# Patient Record
Sex: MALE | Race: WHITE | HISPANIC OR LATINO | Employment: FULL TIME | URBAN - METROPOLITAN AREA
[De-identification: names, ages, dates, MRNs, and addresses within clinical notes are randomized per-mention and may not be internally consistent; named-entity substitution may affect disease eponyms.]

---
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
## 2021-07-29 ENCOUNTER — NON-PROVIDER VISIT (OUTPATIENT)
Dept: URGENT CARE | Facility: PHYSICIAN GROUP | Age: 21
End: 2021-07-29

## 2021-07-29 DIAGNOSIS — Z02.1 PRE-EMPLOYMENT DRUG SCREENING: ICD-10-CM

## 2021-07-29 LAB
AMP AMPHETAMINE: NORMAL
COC COCAINE: NORMAL
INT CON NEG: NORMAL
INT CON POS: NORMAL
MET METHAMPHETAMINES: NORMAL
OPI OPIATES: NORMAL
PCP PHENCYCLIDINE: NORMAL
POC DRUG COMMENT 753798-OCCUPATIONAL HEALTH: NEGATIVE
THC: NORMAL

## 2021-07-29 PROCEDURE — 80305 DRUG TEST PRSMV DIR OPT OBS: CPT | Performed by: PHYSICIAN ASSISTANT

## 2021-10-28 ENCOUNTER — NON-PROVIDER VISIT (OUTPATIENT)
Dept: OCCUPATIONAL MEDICINE | Facility: CLINIC | Age: 21
End: 2021-10-28

## 2021-10-28 DIAGNOSIS — Z02.1 PRE-EMPLOYMENT DRUG SCREENING: ICD-10-CM

## 2021-10-28 PROCEDURE — 80305 DRUG TEST PRSMV DIR OPT OBS: CPT | Performed by: NURSE PRACTITIONER

## 2022-02-19 ENCOUNTER — NON-PROVIDER VISIT (OUTPATIENT)
Dept: URGENT CARE | Facility: CLINIC | Age: 22
End: 2022-02-19

## 2022-02-19 DIAGNOSIS — Z02.1 PRE-EMPLOYMENT DRUG SCREENING: ICD-10-CM

## 2022-02-19 LAB
AMP AMPHETAMINE: NORMAL
COC COCAINE: NORMAL
INT CON NEG: NEGATIVE
INT CON POS: POSITIVE
MET METHAMPHETAMINES: NORMAL
OPI OPIATES: NORMAL
PCP PHENCYCLIDINE: NORMAL
POC DRUG COMMENT 753798-OCCUPATIONAL HEALTH: NEGATIVE
THC: NORMAL

## 2022-02-19 PROCEDURE — 80305 DRUG TEST PRSMV DIR OPT OBS: CPT | Performed by: NURSE PRACTITIONER

## 2023-02-22 ENCOUNTER — HOSPITAL ENCOUNTER (OUTPATIENT)
Dept: LAB | Facility: MEDICAL CENTER | Age: 23
End: 2023-02-22
Attending: NURSE PRACTITIONER
Payer: COMMERCIAL

## 2023-02-22 ENCOUNTER — OFFICE VISIT (OUTPATIENT)
Dept: MEDICAL GROUP | Facility: PHYSICIAN GROUP | Age: 23
End: 2023-02-22
Payer: COMMERCIAL

## 2023-02-22 VITALS
TEMPERATURE: 97.8 F | BODY MASS INDEX: 22.14 KG/M2 | WEIGHT: 149.5 LBS | HEART RATE: 64 BPM | OXYGEN SATURATION: 97 % | HEIGHT: 69 IN | DIASTOLIC BLOOD PRESSURE: 64 MMHG | SYSTOLIC BLOOD PRESSURE: 134 MMHG

## 2023-02-22 DIAGNOSIS — Z23 NEED FOR VACCINATION: ICD-10-CM

## 2023-02-22 DIAGNOSIS — Z11.3 SCREENING EXAMINATION FOR SEXUALLY TRANSMITTED DISEASE: ICD-10-CM

## 2023-02-22 DIAGNOSIS — Z76.89 ENCOUNTER TO ESTABLISH CARE: ICD-10-CM

## 2023-02-22 LAB — HIV 1+2 AB+HIV1 P24 AG SERPL QL IA: NORMAL

## 2023-02-22 PROCEDURE — 87340 HEPATITIS B SURFACE AG IA: CPT

## 2023-02-22 PROCEDURE — 90651 9VHPV VACCINE 2/3 DOSE IM: CPT | Performed by: NURSE PRACTITIONER

## 2023-02-22 PROCEDURE — 87389 HIV-1 AG W/HIV-1&-2 AB AG IA: CPT

## 2023-02-22 PROCEDURE — 87661 TRICHOMONAS VAGINALIS AMPLIF: CPT

## 2023-02-22 PROCEDURE — 87491 CHLMYD TRACH DNA AMP PROBE: CPT

## 2023-02-22 PROCEDURE — 90471 IMMUNIZATION ADMIN: CPT | Performed by: NURSE PRACTITIONER

## 2023-02-22 PROCEDURE — 99203 OFFICE O/P NEW LOW 30 MIN: CPT | Mod: 25 | Performed by: NURSE PRACTITIONER

## 2023-02-22 PROCEDURE — 86803 HEPATITIS C AB TEST: CPT

## 2023-02-22 PROCEDURE — 86780 TREPONEMA PALLIDUM: CPT

## 2023-02-22 PROCEDURE — 86704 HEP B CORE ANTIBODY TOTAL: CPT

## 2023-02-22 PROCEDURE — 86706 HEP B SURFACE ANTIBODY: CPT

## 2023-02-22 PROCEDURE — 36415 COLL VENOUS BLD VENIPUNCTURE: CPT

## 2023-02-22 PROCEDURE — 87591 N.GONORRHOEAE DNA AMP PROB: CPT

## 2023-02-22 NOTE — ASSESSMENT & PLAN NOTE
Mynor is here today to establish care with a new primary care provider. He is currently not taking any medications.

## 2023-02-22 NOTE — PROGRESS NOTES
"Subjective  Chief Complaint  Establish Care    History of Present Illness  Mynor Bush is a 22 y.o. male. This patient is here today to establish care.    Encounter to establish care  Mynor is here today to establish care with a new primary care provider. He is currently not taking any medications.    Screening examination for sexually transmitted disease  Requesting STI screening at this time. He does not know if he has been exposed to any STI's.    Past Medical History    Allergies: Patient has no known allergies.  History reviewed. No pertinent past medical history.  History reviewed. No pertinent surgical history.  No current [facility]-ordered outpatient medications on file.     No current [facility]-ordered facility-administered medications on file.     Family History:    Family History   Problem Relation Age of Onset    No Known Problems Mother     Hypertension Father       Personal/Social History:    Social History     Tobacco Use    Smoking status: Never    Smokeless tobacco: Never   Vaping Use    Vaping Use: Every day    Substances: Nicotine, Flavoring   Substance Use Topics    Alcohol use: Not Currently     Comment: occ    Drug use: Yes     Types: Marijuana     Social History     Social History Narrative    Not on file      Review of Systems:     General: Negative for fever/chills and unexpected weight change.    Respiratory:  Negative for cough and dyspnea.     Cardiovascular:  Negative for chest pain and palpitations.   Musculoskeletal:  Negative for myalgias.    Skin:  Negative for rash.     Objective  Physical Exam:   /64 (BP Location: Left arm, Patient Position: Sitting, BP Cuff Size: Adult)   Pulse 64   Temp 36.6 °C (97.8 °F) (Temporal)   Ht 1.753 m (5' 9\")   Wt 67.8 kg (149 lb 8 oz)   SpO2 97%  Body mass index is 22.08 kg/m².  General:  Alert and oriented.  Well appearing.  NAD.  Head:  Normocephalic.   Neck: Supple without JVD. No lymphadenopathy.  Pulmonary:  Normal effort.  Clear to " ausculation without rales, ronchi, or wheezing.  Cardiovascular:  Regular rate and rhythm without murmur, rubs or gallop.  Radial pulses are intact and equal bilaterally.  Musculoskeletal:  No extremity cyanosis, clubbing, or edema.  Skin:  Warm and dry.  No obvious lesions.  Psych: Normal mood and affect. Alert and oriented x3. Judgment and insight is normal.      Assessment/Plan   1. Encounter to establish care  Mynor is here today to establish care with a new primary care provider.    2. Screening examination for sexually transmitted disease  Acute and ongoing.  He is requesting to get lab work done for STI's, orders placed at this time.  - Chlamydia/GC, PCR (Urine); Future  - HIV AG/AB Combo Assay Screening; Future  - T.Pallidum AB SREEDHAR (Screening); Future  - Trichomonas Vaginalis by TMA; Future  - Hepatitis C Virus Antibody; Future  - HEP B Surface Antibody; Future  - Hep B Core AB Total; Future  - Hep B Surface Antigen; Future    3. Need for vaccination  - Gardasil 9      Health Maintenance: Records Requested    Return if symptoms worsen or fail to improve.    I have placed the above orders and discussed them with an approved delegating provider.  The MA is performing the below orders under the direction of Dr. Karl Alex MD/DO.     Discussed that the patient carries some responsibility in management of their health care.    Please note that this dictation was created using voice recognition software. I have made every reasonable attempt to correct obvious errors, but I expect that there are errors of grammar and possibly content that I did not discover before finalizing the note.    EDMOND Duff  [facility]

## 2023-02-23 LAB
C TRACH DNA SPEC QL NAA+PROBE: NEGATIVE
HBV CORE AB SERPL QL IA: NONREACTIVE
HBV SURFACE AB SERPL IA-ACNC: <3.5 MIU/ML (ref 0–10)
HBV SURFACE AG SER QL: NORMAL
HCV AB SER QL: NORMAL
N GONORRHOEA DNA SPEC QL NAA+PROBE: NEGATIVE
SPECIMEN SOURCE: NORMAL
T PALLIDUM AB SER QL IA: NORMAL

## 2023-02-24 ENCOUNTER — TELEPHONE (OUTPATIENT)
Dept: MEDICAL GROUP | Facility: PHYSICIAN GROUP | Age: 23
End: 2023-02-24

## 2023-02-24 NOTE — TELEPHONE ENCOUNTER
Phone Number Called: [phone]    Call outcome: Spoke to patient regarding message below.    ----- Message from LEONEL DelgadilloPAishaRANUEL sent at 2/24/2023  7:18 AM PST -----  Please call the patient and let him know that his STI testing call came back negative.    Thank you,    EDMOND Duff

## 2023-02-25 LAB
SPEC CONTAINER SPEC: NORMAL
SPECIMEN SOURCE: NORMAL
T VAGINALIS RRNA SPEC QL NAA+PROBE: NEGATIVE

## 2023-04-24 ENCOUNTER — NON-PROVIDER VISIT (OUTPATIENT)
Dept: MEDICAL GROUP | Facility: PHYSICIAN GROUP | Age: 23
End: 2023-04-24
Payer: COMMERCIAL

## 2023-04-24 DIAGNOSIS — Z23 NEED FOR VACCINATION: ICD-10-CM

## 2023-04-24 PROCEDURE — 90651 9VHPV VACCINE 2/3 DOSE IM: CPT | Performed by: NURSE PRACTITIONER

## 2023-04-24 PROCEDURE — 90471 IMMUNIZATION ADMIN: CPT | Performed by: NURSE PRACTITIONER

## 2023-04-24 NOTE — PROGRESS NOTES
"Mynor Bush is a 22 y.o. male here for a non-provider visit for:   HPV 2 of 3    Reason for immunization: continue or complete series started at the office  Immunization records indicate need for vaccine: Yes, confirmed with Epic  Minimum interval has been met for this vaccine: Yes  ABN completed: Not Indicated    VIS Dated  8/6/21 was given to patient: Yes  All IAC Questionnaire questions were answered \"No.\"    Patient tolerated injection and no adverse effects were observed or reported: Yes    Pt scheduled for next dose in series: Yes, 8/25/2023  "

## 2023-06-28 ENCOUNTER — HOSPITAL ENCOUNTER (OUTPATIENT)
Facility: MEDICAL CENTER | Age: 23
End: 2023-06-28
Attending: FAMILY MEDICINE
Payer: COMMERCIAL

## 2023-06-28 ENCOUNTER — HOSPITAL ENCOUNTER (OUTPATIENT)
Dept: LAB | Facility: MEDICAL CENTER | Age: 23
End: 2023-06-28
Attending: FAMILY MEDICINE
Payer: COMMERCIAL

## 2023-06-28 ENCOUNTER — OFFICE VISIT (OUTPATIENT)
Dept: MEDICAL GROUP | Facility: MEDICAL CENTER | Age: 23
End: 2023-06-28
Payer: COMMERCIAL

## 2023-06-28 VITALS
HEART RATE: 77 BPM | TEMPERATURE: 97.5 F | WEIGHT: 151 LBS | SYSTOLIC BLOOD PRESSURE: 146 MMHG | DIASTOLIC BLOOD PRESSURE: 72 MMHG | BODY MASS INDEX: 22.36 KG/M2 | HEIGHT: 69 IN | OXYGEN SATURATION: 98 %

## 2023-06-28 DIAGNOSIS — S60.10XS: ICD-10-CM

## 2023-06-28 DIAGNOSIS — R03.0 ELEVATED BP WITHOUT DIAGNOSIS OF HYPERTENSION: ICD-10-CM

## 2023-06-28 DIAGNOSIS — Z72.0 VAPES NICOTINE CONTAINING SUBSTANCE: ICD-10-CM

## 2023-06-28 DIAGNOSIS — Z11.3 SCREENING EXAMINATION FOR SEXUALLY TRANSMITTED DISEASE: ICD-10-CM

## 2023-06-28 DIAGNOSIS — R39.15 URINARY URGENCY: ICD-10-CM

## 2023-06-28 PROBLEM — Z76.89 ENCOUNTER TO ESTABLISH CARE: Status: RESOLVED | Noted: 2023-02-22 | Resolved: 2023-06-28

## 2023-06-28 LAB
ALBUMIN SERPL BCP-MCNC: 5 G/DL (ref 3.2–4.9)
ALBUMIN/GLOB SERPL: 1.8 G/DL
ALP SERPL-CCNC: 91 U/L (ref 30–99)
ALT SERPL-CCNC: 14 U/L (ref 2–50)
ANION GAP SERPL CALC-SCNC: 14 MMOL/L (ref 7–16)
APPEARANCE UR: CLEAR
APPEARANCE UR: CLEAR
AST SERPL-CCNC: 15 U/L (ref 12–45)
BASOPHILS # BLD AUTO: 0.9 % (ref 0–1.8)
BASOPHILS # BLD: 0.07 K/UL (ref 0–0.12)
BILIRUB SERPL-MCNC: 0.4 MG/DL (ref 0.1–1.5)
BILIRUB UR QL STRIP.AUTO: NEGATIVE
BILIRUB UR STRIP-MCNC: NEGATIVE MG/DL
BUN SERPL-MCNC: 12 MG/DL (ref 8–22)
C TRACH DNA SPEC QL NAA+PROBE: NEGATIVE
CALCIUM ALBUM COR SERPL-MCNC: 9 MG/DL (ref 8.5–10.5)
CALCIUM SERPL-MCNC: 9.8 MG/DL (ref 8.5–10.5)
CHLORIDE SERPL-SCNC: 101 MMOL/L (ref 96–112)
CO2 SERPL-SCNC: 24 MMOL/L (ref 20–33)
COLOR UR AUTO: YELLOW
COLOR UR: YELLOW
CREAT SERPL-MCNC: 0.86 MG/DL (ref 0.5–1.4)
CRP SERPL HS-MCNC: <0.3 MG/DL (ref 0–0.75)
EOSINOPHIL # BLD AUTO: 0.23 K/UL (ref 0–0.51)
EOSINOPHIL NFR BLD: 2.9 % (ref 0–6.9)
ERYTHROCYTE [DISTWIDTH] IN BLOOD BY AUTOMATED COUNT: 42.4 FL (ref 35.9–50)
GFR SERPLBLD CREATININE-BSD FMLA CKD-EPI: 125 ML/MIN/1.73 M 2
GLOBULIN SER CALC-MCNC: 2.8 G/DL (ref 1.9–3.5)
GLUCOSE SERPL-MCNC: 106 MG/DL (ref 65–99)
GLUCOSE UR STRIP.AUTO-MCNC: NEGATIVE MG/DL
GLUCOSE UR STRIP.AUTO-MCNC: NEGATIVE MG/DL
HCT VFR BLD AUTO: 44.9 % (ref 42–52)
HGB BLD-MCNC: 15.6 G/DL (ref 14–18)
HIV 1+2 AB+HIV1 P24 AG SERPL QL IA: NORMAL
IMM GRANULOCYTES # BLD AUTO: 0.02 K/UL (ref 0–0.11)
IMM GRANULOCYTES NFR BLD AUTO: 0.2 % (ref 0–0.9)
KETONES UR STRIP.AUTO-MCNC: NEGATIVE MG/DL
KETONES UR STRIP.AUTO-MCNC: NEGATIVE MG/DL
LEUKOCYTE ESTERASE UR QL STRIP.AUTO: NEGATIVE
LEUKOCYTE ESTERASE UR QL STRIP.AUTO: NEGATIVE
LYMPHOCYTES # BLD AUTO: 3.15 K/UL (ref 1–4.8)
LYMPHOCYTES NFR BLD: 39.3 % (ref 22–41)
MCH RBC QN AUTO: 31.5 PG (ref 27–33)
MCHC RBC AUTO-ENTMCNC: 34.7 G/DL (ref 32.3–36.5)
MCV RBC AUTO: 90.7 FL (ref 81.4–97.8)
MICRO URNS: NORMAL
MONOCYTES # BLD AUTO: 0.62 K/UL (ref 0–0.85)
MONOCYTES NFR BLD AUTO: 7.7 % (ref 0–13.4)
N GONORRHOEA DNA SPEC QL NAA+PROBE: NEGATIVE
NEUTROPHILS # BLD AUTO: 3.92 K/UL (ref 1.82–7.42)
NEUTROPHILS NFR BLD: 49 % (ref 44–72)
NITRITE UR QL STRIP.AUTO: NEGATIVE
NITRITE UR QL STRIP.AUTO: NEGATIVE
NRBC # BLD AUTO: 0 K/UL
NRBC BLD-RTO: 0 /100 WBC (ref 0–0.2)
PH UR STRIP.AUTO: 5 [PH] (ref 5–8)
PH UR STRIP.AUTO: 6.5 [PH] (ref 5–8)
PLATELET # BLD AUTO: 278 K/UL (ref 164–446)
PMV BLD AUTO: 10.7 FL (ref 9–12.9)
POTASSIUM SERPL-SCNC: 3.5 MMOL/L (ref 3.6–5.5)
PROT SERPL-MCNC: 7.8 G/DL (ref 6–8.2)
PROT UR QL STRIP: NEGATIVE MG/DL
PROT UR QL STRIP: NEGATIVE MG/DL
PSA SERPL-MCNC: 1.31 NG/ML (ref 0–4)
RBC # BLD AUTO: 4.95 M/UL (ref 4.7–6.1)
RBC UR QL AUTO: NEGATIVE
RBC UR QL AUTO: NORMAL
SODIUM SERPL-SCNC: 139 MMOL/L (ref 135–145)
SP GR UR STRIP.AUTO: 1.01
SP GR UR STRIP.AUTO: 1.03
SPECIMEN SOURCE: NORMAL
T PALLIDUM AB SER QL IA: NORMAL
TSH SERPL DL<=0.005 MIU/L-ACNC: 3.66 UIU/ML (ref 0.38–5.33)
UROBILINOGEN UR STRIP-MCNC: 0.2 MG/DL
UROBILINOGEN UR STRIP.AUTO-MCNC: 0.2 MG/DL
WBC # BLD AUTO: 8 K/UL (ref 4.8–10.8)

## 2023-06-28 PROCEDURE — 86140 C-REACTIVE PROTEIN: CPT

## 2023-06-28 PROCEDURE — 86780 TREPONEMA PALLIDUM: CPT

## 2023-06-28 PROCEDURE — 85025 COMPLETE CBC W/AUTO DIFF WBC: CPT

## 2023-06-28 PROCEDURE — 81002 URINALYSIS NONAUTO W/O SCOPE: CPT | Performed by: FAMILY MEDICINE

## 2023-06-28 PROCEDURE — 87491 CHLMYD TRACH DNA AMP PROBE: CPT

## 2023-06-28 PROCEDURE — 99214 OFFICE O/P EST MOD 30 MIN: CPT | Performed by: FAMILY MEDICINE

## 2023-06-28 PROCEDURE — 87086 URINE CULTURE/COLONY COUNT: CPT | Mod: 91

## 2023-06-28 PROCEDURE — 81003 URINALYSIS AUTO W/O SCOPE: CPT

## 2023-06-28 PROCEDURE — 3078F DIAST BP <80 MM HG: CPT | Performed by: FAMILY MEDICINE

## 2023-06-28 PROCEDURE — 80053 COMPREHEN METABOLIC PANEL: CPT

## 2023-06-28 PROCEDURE — 87591 N.GONORRHOEAE DNA AMP PROB: CPT

## 2023-06-28 PROCEDURE — 87389 HIV-1 AG W/HIV-1&-2 AB AG IA: CPT

## 2023-06-28 PROCEDURE — 3077F SYST BP >= 140 MM HG: CPT | Performed by: FAMILY MEDICINE

## 2023-06-28 PROCEDURE — 36415 COLL VENOUS BLD VENIPUNCTURE: CPT

## 2023-06-28 PROCEDURE — 84443 ASSAY THYROID STIM HORMONE: CPT

## 2023-06-28 PROCEDURE — 87086 URINE CULTURE/COLONY COUNT: CPT

## 2023-06-28 PROCEDURE — 84153 ASSAY OF PSA TOTAL: CPT

## 2023-06-28 RX ORDER — OXYBUTYNIN CHLORIDE 5 MG/1
5 TABLET, EXTENDED RELEASE ORAL DAILY
Qty: 30 TABLET | Refills: 0 | Status: SHIPPED | OUTPATIENT
Start: 2023-06-28

## 2023-06-28 RX ORDER — NICOTINE 21 MG/24HR
1 PATCH, TRANSDERMAL 24 HOURS TRANSDERMAL EVERY 24 HOURS
Qty: 28 PATCH | Refills: 0 | Status: SHIPPED | OUTPATIENT
Start: 2023-06-28

## 2023-06-28 ASSESSMENT — PATIENT HEALTH QUESTIONNAIRE - PHQ9: CLINICAL INTERPRETATION OF PHQ2 SCORE: 0

## 2023-06-28 NOTE — PROGRESS NOTES
Subjective:     CC:  Diagnoses of Urinary urgency, Screening examination for sexually transmitted disease, Vapes nicotine containing substance, Contusion of fingernail, sequela, and Elevated BP without diagnosis of hypertension were pertinent to this visit.    HISTORY OF THE PRESENT ILLNESS: Patient is a 22 y.o. male. This pleasant patient is here today to establish care and discuss urinary urgency, vaping, and finger.     Having urine problems  Having urgency, when he is active  Seems to be from any pressure that causes urge  Feels like he might leak when he sits down but is able to schedule it  Been going on the last 4-5 months ago  Not painful when he urinates, no burning  Feels like his bladder is not emptying  Had some sweats one night a couple of days ago  Has a cough but blames it on vaping  No discharge or rashes    He has been working his vape dose down to 5%. He would like to quit for his birthday.    He had trauma to his 5th digit. Was not able to get full story given time constraints.        Current Outpatient Medications Ordered in Epic   Medication Sig Dispense Refill    oxybutynin SR (DITROPAN-XL) 5 MG TABLET SR 24 HR Take 1 Tablet by mouth every day. 30 Tablet 0    nicotine (NICODERM) 14 MG/24HR PATCH 24 HR Place 1 Patch on the skin every 24 hours. 28 Patch 0     No current Epic-ordered facility-administered medications on file.     Past Medical History:   Diagnosis Date    Encounter to establish care 2/22/2023      History reviewed. No pertinent surgical history.     Family History   Problem Relation Age of Onset    No Known Problems Mother     Hypertension Father       Social History     Socioeconomic History    Marital status: Single   Tobacco Use    Smoking status: Never    Smokeless tobacco: Never   Vaping Use    Vaping Use: Every day    Substances: Nicotine, Flavoring   Substance and Sexual Activity    Alcohol use: Not Currently     Comment: occassions    Drug use: Not Currently     Types:  "Marijuana    Sexual activity: Yes     Partners: Female     Birth control/protection: Condom          Health Maintenance: acute visit    ROS:   ROS see HPI      Objective:     Exam: BP (!) 146/72 (BP Location: Right arm, Patient Position: Sitting, BP Cuff Size: Adult)   Pulse 77   Temp 36.4 °C (97.5 °F) (Temporal)   Ht 1.753 m (5' 9\")   Wt 68.5 kg (151 lb)   SpO2 98%  Body mass index is 22.3 kg/m².    Physical Exam  Vitals reviewed.   Constitutional:       General: He is not in acute distress.     Appearance: Normal appearance.   HENT:      Head: Normocephalic and atraumatic.   Cardiovascular:      Rate and Rhythm: Normal rate and regular rhythm.      Heart sounds: Normal heart sounds.   Pulmonary:      Effort: Pulmonary effort is normal. No respiratory distress.      Breath sounds: Normal breath sounds.   Abdominal:      General: Abdomen is flat. Bowel sounds are normal. There is no distension.      Palpations: Abdomen is soft. There is no mass.      Tenderness: There is no abdominal tenderness. There is no right CVA tenderness, left CVA tenderness, guarding or rebound.      Hernia: No hernia is present.   Genitourinary:     Penis: Normal.       Testes: Normal.   Skin:     General: Skin is warm and dry.      Comments: 5th digit nail is dark with appearance of dried blood underneath, no swelling, redness or dicharge   Neurological:      Mental Status: He is alert. Mental status is at baseline.      Gait: Gait normal.   Psychiatric:         Mood and Affect: Mood normal.         Behavior: Behavior normal.           Assessment & Plan:   22 y.o. male with the following -    1. Urinary urgency  POC UA showed trace blood, would like microscopy to get a more accurate count on this. Patient to trial oxybutinin. Given how long this has been going on will initiate broader work up  - POCT Urinalysis  - URINE CULTURE(NEW); Future  - CBC WITH DIFFERENTIAL; Future  - Comp Metabolic Panel; Future  - TSH WITH REFLEX TO FT4; " Future  - PROSTATE SPECIFIC AG DIAGNOSTIC; Future  - CRP QUANTITIVE (NON-CARDIAC); Future  - US-RENAL; Future  - oxybutynin SR (DITROPAN-XL) 5 MG TABLET SR 24 HR; Take 1 Tablet by mouth every day.  Dispense: 30 Tablet; Refill: 0  - URINALYSIS; Future    2. Screening examination for sexually transmitted disease  - Chlamydia/GC, PCR (Urine); Future  - T.PALLIDUM AB SREEDHAR (SCREENING); Future  - HIV AG/AB COMBO ASSAY SCREENING; Future    3. Vapes nicotine containing substance  Patient motivated to quit, will trial patches to help with cessation  - nicotine (NICODERM) 14 MG/24HR PATCH 24 HR; Place 1 Patch on the skin every 24 hours.  Dispense: 28 Patch; Refill: 0    4. Contusion of fingernail, sequela  Not able to fully assess. Appears that nail will come off with time, no active bleeding, no signs of infection. Patient to monitor for concerning changes.    5. Elevated BP without diagnosis of hypertension  Patient reports drinking monster energy drink. Discussed this may not be best habit for him. Will monitor at future visits.      Return in about 4 weeks (around 7/26/2023) for Lab F/U, Imaging F/U.    Please note that this dictation was created using voice recognition software. I have made every reasonable attempt to correct obvious errors, but I expect that there are errors of grammar and possibly content that I did not discover before finalizing the note.

## 2023-06-30 LAB
BACTERIA UR CULT: NORMAL
SIGNIFICANT IND 70042: NORMAL
SITE SITE: NORMAL
SOURCE SOURCE: NORMAL

## 2023-07-01 LAB
BACTERIA UR CULT: NORMAL
SIGNIFICANT IND 70042: NORMAL
SITE SITE: NORMAL
SOURCE SOURCE: NORMAL

## 2023-07-06 ENCOUNTER — HOSPITAL ENCOUNTER (OUTPATIENT)
Dept: RADIOLOGY | Facility: MEDICAL CENTER | Age: 23
End: 2023-07-06
Attending: FAMILY MEDICINE
Payer: COMMERCIAL

## 2023-07-06 DIAGNOSIS — N28.89 LEFT RENAL MASS: ICD-10-CM

## 2023-07-06 DIAGNOSIS — R39.15 URINARY URGENCY: ICD-10-CM

## 2023-07-06 PROCEDURE — 76775 US EXAM ABDO BACK WALL LIM: CPT

## 2024-09-02 ENCOUNTER — APPOINTMENT (OUTPATIENT)
Dept: RADIOLOGY | Facility: IMAGING CENTER | Age: 24
End: 2024-09-02
Attending: NURSE PRACTITIONER
Payer: COMMERCIAL

## 2024-09-02 ENCOUNTER — OFFICE VISIT (OUTPATIENT)
Dept: URGENT CARE | Facility: CLINIC | Age: 24
End: 2024-09-02
Payer: COMMERCIAL

## 2024-09-02 VITALS
HEART RATE: 86 BPM | BODY MASS INDEX: 25.15 KG/M2 | RESPIRATION RATE: 20 BRPM | HEIGHT: 69 IN | OXYGEN SATURATION: 99 % | SYSTOLIC BLOOD PRESSURE: 120 MMHG | DIASTOLIC BLOOD PRESSURE: 66 MMHG | WEIGHT: 169.8 LBS | TEMPERATURE: 99.6 F

## 2024-09-02 DIAGNOSIS — S93.402A SPRAIN OF LEFT ANKLE, UNSPECIFIED LIGAMENT, INITIAL ENCOUNTER: ICD-10-CM

## 2024-09-02 DIAGNOSIS — S99.912A LEFT ANKLE INJURY, INITIAL ENCOUNTER: ICD-10-CM

## 2024-09-02 PROCEDURE — 99213 OFFICE O/P EST LOW 20 MIN: CPT | Performed by: NURSE PRACTITIONER

## 2024-09-02 PROCEDURE — 3074F SYST BP LT 130 MM HG: CPT | Performed by: NURSE PRACTITIONER

## 2024-09-02 PROCEDURE — 3078F DIAST BP <80 MM HG: CPT | Performed by: NURSE PRACTITIONER

## 2024-09-02 PROCEDURE — 73610 X-RAY EXAM OF ANKLE: CPT | Mod: TC,LT | Performed by: RADIOLOGY

## 2024-09-02 ASSESSMENT — FIBROSIS 4 INDEX: FIB4 SCORE: 0.35

## 2024-09-02 NOTE — PROGRESS NOTES
"Verbal consent was acquired by the patient to use [employer] ambient listening note generation during this visit.    Subjective:     HPI:   History of Present Illness  The patient is a 24-year-old male who presents for evaluation of left ankle pain. He is accompanied by an adult female.    He sustained an injury to his left ankle while playing basketball. The incident occurred when he landed on the side of his foot, causing it to flatten and resulting in a snapping sound. The ankle is beginning to show signs of bruising and swelling. He experiences a buildup of pressure in the ankle when seated, and movement exacerbates the pain. Despite the discomfort, he retains mobility in the ankle but is unable to bear weight.    He attempted to walk immediately after the incident to alleviate the pain. He was administered Tylenol last night but did not take any this morning in anticipation of potential medication from the clinic. Prior to the visit, Arnica gel was applied to the affected area and an ankle stabilizer was used.  He denies any numbness or tingling.  No sensory deficit.  He denies any inability to move his foot or ankle.       Objective:     Exam:  /66   Pulse 86   Temp 37.6 °C (99.6 °F) (Temporal)   Resp 20   Ht 1.753 m (5' 9\")   Wt 77 kg (169 lb 12.8 oz)   SpO2 99%   BMI 25.08 kg/m²  Body mass index is 25.08 kg/m².    Physical Exam  Vitals and nursing note reviewed.   Constitutional:       General: He is not in acute distress.     Appearance: Normal appearance. He is not ill-appearing.   HENT:      Head: Normocephalic and atraumatic.      Nose: Nose normal.   Cardiovascular:      Rate and Rhythm: Normal rate.      Pulses: Normal pulses.   Pulmonary:      Effort: Pulmonary effort is normal.   Musculoskeletal:      Cervical back: Normal range of motion.      Right ankle: Normal.      Left ankle: Swelling present. No deformity, ecchymosis or lacerations. Tenderness present over the lateral malleolus. " Decreased range of motion.      Left Achilles Tendon: Normal.      Comments: There is mild to moderate swelling to the lateral left ankle. No erythema or ecchymosis.  Some bruising is noted. There is tenderness to palpation over the lateral ankle.  Sensation is intact. ROM limited due to pain.  Pt with limited ability to bear weight.    Neurological:      Mental Status: He is alert.           RADIOLOGY RESULTS   DX-ANKLE 3+ VIEWS LEFT    Result Date: 9/2/2024 9/2/2024 9:34 AM HISTORY/REASON FOR EXAM:  Pain/Deformity Following Trauma. TECHNIQUE/EXAM DESCRIPTION AND NUMBER OF VIEWS:  3 views of the LEFT ankle. COMPARISON: None. FINDINGS: There is no fracture. Alignment is normal. No degenerative changes are present.     Negative left ankle series           Assessment & Plan:     1. Sprain of left ankle, unspecified ligament, initial encounter  Referral to Orthopedics      2. Left ankle injury, initial encounter  DX-ANKLE 3+ VIEWS LEFT          Assessment & Plan  1. Left ankle sprain, acute.   The patient presents with left ankle pain following a basketball injury. He reports bruising and pain, especially when moving the ankle side to side. There is no significant pain when pressure is applied to the back of the ankle. He has been using Tylenol and Arnica gel for pain relief and has applied an ankle stabilizer. An x-ray of the left ankle was negative for fracture or dislocation today. Patient has been placed in a boot, advised RICE therapy, and given crutches. Referral to orthopedics has been placed. Patient was advised to seek care at [facility] or [facility] today or early tomorrow morning for more immediate evaluation as needed due to his occupation as a .                KAE Hernández.      Please note that this dictation was created using voice recognition software. I have made every reasonable attempt to correct obvious errors, but I expect that there are errors of grammar and possibly content  that I did not discover before finalizing the note.

## 2024-09-02 NOTE — LETTER
September 2, 2024       Patient: Mynor Childs   YOB: 2000   Date of Visit: 9/2/2024         To Whom It May Concern:    In my medical opinion, I recommend that Mynor Childs be excused from work 9/3/2024 due to medical condition.    If you have any questions or concerns, please don't hesitate to call [phone]          Sincerely,          Destiney Zamora A.P.R.N.  Electronically Signed

## 2024-09-05 ENCOUNTER — TELEPHONE (OUTPATIENT)
Dept: URGENT CARE | Facility: CLINIC | Age: 24
End: 2024-09-05
Payer: COMMERCIAL

## 2024-09-06 NOTE — TELEPHONE ENCOUNTER
He is stating he is still experiencing swelling and Px in ankle, he is requesting that you excuse him from work tomorrow 9/6/2024, 9/9/2024, and 9/10/2024, as he is planning to not attend work again, due to the pain and swelling.      If you are able to create a note, please send to [facility].      I did advise him that he may be required to be treated again for further eval.

## 2024-12-23 ENCOUNTER — HOSPITAL ENCOUNTER (EMERGENCY)
Facility: MEDICAL CENTER | Age: 24
End: 2024-12-23
Attending: EMERGENCY MEDICINE
Payer: COMMERCIAL

## 2024-12-23 VITALS
SYSTOLIC BLOOD PRESSURE: 129 MMHG | TEMPERATURE: 99.8 F | RESPIRATION RATE: 21 BRPM | BODY MASS INDEX: 25.7 KG/M2 | HEIGHT: 69 IN | HEART RATE: 78 BPM | OXYGEN SATURATION: 98 % | DIASTOLIC BLOOD PRESSURE: 71 MMHG | WEIGHT: 173.5 LBS

## 2024-12-23 DIAGNOSIS — R19.7 VOMITING AND DIARRHEA: ICD-10-CM

## 2024-12-23 DIAGNOSIS — R11.10 VOMITING AND DIARRHEA: ICD-10-CM

## 2024-12-23 LAB
ALBUMIN SERPL BCP-MCNC: 4.4 G/DL (ref 3.2–4.9)
ALBUMIN/GLOB SERPL: 1.3 G/DL
ALP SERPL-CCNC: 81 U/L (ref 30–99)
ALT SERPL-CCNC: 18 U/L (ref 2–50)
AMORPHOUS CRYSTALS 1764: PRESENT /HPF
ANION GAP SERPL CALC-SCNC: 11 MMOL/L (ref 7–16)
APPEARANCE UR: CLEAR
AST SERPL-CCNC: 15 U/L (ref 12–45)
BACTERIA #/AREA URNS HPF: ABNORMAL /HPF
BASOPHILS # BLD AUTO: 0.1 % (ref 0–1.8)
BASOPHILS # BLD: 0.01 K/UL (ref 0–0.12)
BILIRUB SERPL-MCNC: 0.8 MG/DL (ref 0.1–1.5)
BILIRUB UR QL STRIP.AUTO: NEGATIVE
BUN SERPL-MCNC: 11 MG/DL (ref 8–22)
CALCIUM ALBUM COR SERPL-MCNC: 8.9 MG/DL (ref 8.5–10.5)
CALCIUM SERPL-MCNC: 9.2 MG/DL (ref 8.4–10.2)
CHLORIDE SERPL-SCNC: 107 MMOL/L (ref 96–112)
CO2 SERPL-SCNC: 23 MMOL/L (ref 20–33)
COLOR UR: YELLOW
CREAT SERPL-MCNC: 0.83 MG/DL (ref 0.5–1.4)
EOSINOPHIL # BLD AUTO: 0.14 K/UL (ref 0–0.51)
EOSINOPHIL NFR BLD: 2 % (ref 0–6.9)
EPITHELIAL CELLS 1715: ABNORMAL /HPF (ref 0–5)
ERYTHROCYTE [DISTWIDTH] IN BLOOD BY AUTOMATED COUNT: 39.5 FL (ref 35.9–50)
GFR SERPLBLD CREATININE-BSD FMLA CKD-EPI: 125 ML/MIN/1.73 M 2
GLOBULIN SER CALC-MCNC: 3.5 G/DL (ref 1.9–3.5)
GLUCOSE SERPL-MCNC: 106 MG/DL (ref 65–99)
GLUCOSE UR STRIP.AUTO-MCNC: NEGATIVE MG/DL
HCT VFR BLD AUTO: 45.5 % (ref 42–52)
HGB BLD-MCNC: 16.1 G/DL (ref 14–18)
IMM GRANULOCYTES # BLD AUTO: 0.01 K/UL (ref 0–0.11)
IMM GRANULOCYTES NFR BLD AUTO: 0.1 % (ref 0–0.9)
KETONES UR STRIP.AUTO-MCNC: NEGATIVE MG/DL
LEUKOCYTE ESTERASE UR QL STRIP.AUTO: NEGATIVE
LIPASE SERPL-CCNC: 22 U/L (ref 11–82)
LYMPHOCYTES # BLD AUTO: 1.15 K/UL (ref 1–4.8)
LYMPHOCYTES NFR BLD: 16.5 % (ref 22–41)
MCH RBC QN AUTO: 30.9 PG (ref 27–33)
MCHC RBC AUTO-ENTMCNC: 35.4 G/DL (ref 32.3–36.5)
MCV RBC AUTO: 87.3 FL (ref 81.4–97.8)
MICRO URNS: ABNORMAL
MONOCYTES # BLD AUTO: 0.6 K/UL (ref 0–0.85)
MONOCYTES NFR BLD AUTO: 8.6 % (ref 0–13.4)
NEUTROPHILS # BLD AUTO: 5.08 K/UL (ref 1.82–7.42)
NEUTROPHILS NFR BLD: 72.7 % (ref 44–72)
NITRITE UR QL STRIP.AUTO: NEGATIVE
NRBC # BLD AUTO: 0 K/UL
NRBC BLD-RTO: 0 /100 WBC (ref 0–0.2)
PH UR STRIP.AUTO: 8 [PH] (ref 5–8)
PLATELET # BLD AUTO: 300 K/UL (ref 164–446)
PMV BLD AUTO: 9.8 FL (ref 9–12.9)
POTASSIUM SERPL-SCNC: 3.7 MMOL/L (ref 3.6–5.5)
PROT SERPL-MCNC: 7.9 G/DL (ref 6–8.2)
PROT UR QL STRIP: 100 MG/DL
RBC # BLD AUTO: 5.21 M/UL (ref 4.7–6.1)
RBC # URNS HPF: ABNORMAL /HPF
RBC UR QL AUTO: NEGATIVE
SODIUM SERPL-SCNC: 141 MMOL/L (ref 135–145)
SP GR UR STRIP.AUTO: 1.01
WBC # BLD AUTO: 7 K/UL (ref 4.8–10.8)
WBC #/AREA URNS HPF: ABNORMAL /HPF

## 2024-12-23 PROCEDURE — 99284 EMERGENCY DEPT VISIT MOD MDM: CPT

## 2024-12-23 PROCEDURE — 85025 COMPLETE CBC W/AUTO DIFF WBC: CPT

## 2024-12-23 PROCEDURE — 83690 ASSAY OF LIPASE: CPT

## 2024-12-23 PROCEDURE — A9270 NON-COVERED ITEM OR SERVICE: HCPCS | Performed by: EMERGENCY MEDICINE

## 2024-12-23 PROCEDURE — 36415 COLL VENOUS BLD VENIPUNCTURE: CPT

## 2024-12-23 PROCEDURE — 80053 COMPREHEN METABOLIC PANEL: CPT

## 2024-12-23 PROCEDURE — 700111 HCHG RX REV CODE 636 W/ 250 OVERRIDE (IP): Performed by: EMERGENCY MEDICINE

## 2024-12-23 PROCEDURE — 700102 HCHG RX REV CODE 250 W/ 637 OVERRIDE(OP): Performed by: EMERGENCY MEDICINE

## 2024-12-23 PROCEDURE — 81001 URINALYSIS AUTO W/SCOPE: CPT

## 2024-12-23 RX ORDER — ONDANSETRON 4 MG/1
4 TABLET, ORALLY DISINTEGRATING ORAL EVERY 6 HOURS PRN
Qty: 10 TABLET | Refills: 0 | Status: SHIPPED | OUTPATIENT
Start: 2024-12-23

## 2024-12-23 RX ORDER — ACETAMINOPHEN 500 MG
1000 TABLET ORAL ONCE
Status: COMPLETED | OUTPATIENT
Start: 2024-12-23 | End: 2024-12-23

## 2024-12-23 RX ORDER — ONDANSETRON 4 MG/1
4 TABLET, ORALLY DISINTEGRATING ORAL ONCE
Status: COMPLETED | OUTPATIENT
Start: 2024-12-23 | End: 2024-12-23

## 2024-12-23 RX ADMIN — ACETAMINOPHEN 1000 MG: 500 TABLET ORAL at 22:29

## 2024-12-23 RX ADMIN — ONDANSETRON 4 MG: 4 TABLET, ORALLY DISINTEGRATING ORAL at 21:54

## 2024-12-23 ASSESSMENT — FIBROSIS 4 INDEX: FIB4 SCORE: 0.35

## 2024-12-23 NOTE — Clinical Note
Mynor Childs was seen and treated in our emergency department on 12/23/2024.  He may return to work on 12/26/2024.       If you have any questions or concerns, please don't hesitate to call.      Dinh Vaughan D.O.

## 2024-12-24 NOTE — ED PROVIDER NOTES
"ER Provider Note    Scribed for Dinh Vaughan D.O. by Senait Davis. 12/23/2024  9:46 PM    Primary Care Provider: Michael Saucedo D.O.    CHIEF COMPLAINT  Chief Complaint   Patient presents with    Nausea/Vomiting/Diarrhea     PT presents d/t nausea, vomiting, diarrhea, and chills x 2 days      HPI/ROS    Mynor Childs is a 24 y.o. male who presents to the Emergency Department for flu like symptoms onset a few days ago. He reports associated diarrhea, vomiting, nausea and body aches. Patient states he has been sick for the last few days but these symptoms just began 2 days ago. He notes he is unable to keep down fluids. No alleviating or exacerbating factors noted. Denies abdominal pain. Denies abdominal history.     ROS as per HPI.    PAST MEDICAL HISTORY  Past Medical History:   Diagnosis Date    Encounter to [facility] 2/22/2023     SURGICAL HISTORY  History reviewed. No pertinent surgical history.    FAMILY HISTORY  Family History   Problem Relation Age of Onset    No Known Problems Mother     Hypertension Father      SOCIAL HISTORY   reports that he has never smoked. He has never used smokeless tobacco. He reports that he does not currently use alcohol. He reports that he does not currently use drugs after having used the following drugs: Marijuana.    CURRENT MEDICATIONS  Discharge Medication List as of 12/23/2024 10:26 PM        CONTINUE these medications which have NOT CHANGED    Details   oxybutynin SR (DITROPAN-XL) 5 MG TABLET SR 24 HR Take 1 Tablet by mouth every day., Disp-30 Tablet, R-0, Normal      nicotine (NICODERM) 14 MG/24HR PATCH 24 HR Place 1 Patch on the skin every 24 hours., Disp-28 Patch, R-0, Normal           ALLERGIES  Patient has no known allergies.    PHYSICAL EXAM  /84   Pulse (!) 104   Temp 37.7 °C (99.8 °F) (Temporal)   Resp 20   Ht 1.753 m (5' 9\")   Wt 78.7 kg (173 lb 8 oz)   SpO2 95%   BMI 25.62 kg/m²     General: No acute distress.  HENT: " Normocephalic, Mucus membranes are moist.   Chest: Lungs have even and unlabored respirations, Clear to auscultation.   Cardiovascular: Regular rate and regular rhythm, No peripheral cyanosis.  Abdomen: Non distended.  Neuro: Awake, Conversive, Able to relay recent events.  Psychiatric: Calm and cooperative.     INITIAL ASSESSMENT  Patient with vomiting, diarrhea, no significant abdominal pain. No abdominal tenderness. Currently gastroenteritis endemic in the community. Will treat for nausea and give fluids PO. Will evaluate for electrolyte imbalance and infection with lab.     ED Observation Status? No; Patient does not meet criteria for ED Observation.     DIAGNOSTIC STUDIES  Labs:   Results for orders placed or performed during the hospital encounter of 12/23/24   CBC WITH DIFFERENTIAL    Collection Time: 12/23/24  8:09 PM   Result Value Ref Range    WBC 7.0 4.8 - 10.8 K/uL    RBC 5.21 4.70 - 6.10 M/uL    Hemoglobin 16.1 14.0 - 18.0 g/dL    Hematocrit 45.5 42.0 - 52.0 %    MCV 87.3 81.4 - 97.8 fL    MCH 30.9 27.0 - 33.0 pg    MCHC 35.4 32.3 - 36.5 g/dL    RDW 39.5 35.9 - 50.0 fL    Platelet Count 300 164 - 446 K/uL    MPV 9.8 9.0 - 12.9 fL    Neutrophils-Polys 72.70 (H) 44.00 - 72.00 %    Lymphocytes 16.50 (L) 22.00 - 41.00 %    Monocytes 8.60 0.00 - 13.40 %    Eosinophils 2.00 0.00 - 6.90 %    Basophils 0.10 0.00 - 1.80 %    Immature Granulocytes 0.10 0.00 - 0.90 %    Nucleated RBC 0.00 0.00 - 0.20 /100 WBC    Neutrophils (Absolute) 5.08 1.82 - 7.42 K/uL    Lymphs (Absolute) 1.15 1.00 - 4.80 K/uL    Monos (Absolute) 0.60 0.00 - 0.85 K/uL    Eos (Absolute) 0.14 0.00 - 0.51 K/uL    Baso (Absolute) 0.01 0.00 - 0.12 K/uL    Immature Granulocytes (abs) 0.01 0.00 - 0.11 K/uL    NRBC (Absolute) 0.00 K/uL   COMP METABOLIC PANEL    Collection Time: 12/23/24  8:09 PM   Result Value Ref Range    Sodium 141 135 - 145 mmol/L    Potassium 3.7 3.6 - 5.5 mmol/L    Chloride 107 96 - 112 mmol/L    Co2 23 20 - 33 mmol/L    Anion  Gap 11.0 7.0 - 16.0    Glucose 106 (H) 65 - 99 mg/dL    Bun 11 8 - 22 mg/dL    Creatinine 0.83 0.50 - 1.40 mg/dL    Calcium 9.2 8.4 - 10.2 mg/dL    Correct Calcium 8.9 8.5 - 10.5 mg/dL    AST(SGOT) 15 12 - 45 U/L    ALT(SGPT) 18 2 - 50 U/L    Alkaline Phosphatase 81 30 - 99 U/L    Total Bilirubin 0.8 0.1 - 1.5 mg/dL    Albumin 4.4 3.2 - 4.9 g/dL    Total Protein 7.9 6.0 - 8.2 g/dL    Globulin 3.5 1.9 - 3.5 g/dL    A-G Ratio 1.3 g/dL   LIPASE    Collection Time: 12/23/24  8:09 PM   Result Value Ref Range    Lipase 22 11 - 82 U/L   ESTIMATED GFR    Collection Time: 12/23/24  8:09 PM   Result Value Ref Range    GFR (CKD-EPI) 125 >60 mL/min/1.73 m 2   URINALYSIS    Collection Time: 12/23/24 10:05 PM    Specimen: Urine, Clean Catch   Result Value Ref Range    Color Yellow     Character Clear     Specific Gravity 1.010 <1.035    Ph 8.0 5.0 - 8.0    Glucose Negative Negative mg/dL    Ketones Negative Negative mg/dL    Protein 100 (A) Negative mg/dL    Bilirubin Negative Negative    Nitrite Negative Negative    Leukocyte Esterase Negative Negative    Occult Blood Negative Negative    Micro Urine Req Microscopic    URINE MICROSCOPIC (W/UA)    Collection Time: 12/23/24 10:05 PM   Result Value Ref Range    WBC 0-2 /hpf    RBC 0-2 /hpf    Bacteria Rare (A) None /hpf    Epithelial Cells 0-2 0 - 5 /hpf    Amorphous Crystal Present (A) Absent /hpf     COURSE & MEDICAL DECISION MAKING     COURSE AND PLAN  9:52 PM - Patient was seen and evaluated at bedside. Patient presents to the ED for flu like symptoms.  After my exam, I discussed with the patient the plan of care, which includes treating the patient with medication for their symptoms, as well as obtaining lab work  for further evaluation. Patient understands and verbalizes agreement to plan of care. Patient will be treated with Zofran 4 mg. Ordered CBC w diff, CMP, lipase and UA to evaluate.     10:20 PM - Patient was reevaluated at bedside. Patient is tolerating fluids. I then  informed the patient of my plan for discharge, which includes strict return precautions for any new or worsening symptoms. Patient understands and verbalizes agreement to plan of care. Patient is comfortable going home at this time.     ED Summary: Patient presents with vomiting diarrhea.  There is no signs of dehydration lab tests are normal.  He was treated for his nausea is able to tolerate liquids.  With this he is stable for discharge home.    DISPOSITION AND DISCUSSIONS  The patient will return for new or worsening symptoms and is stable at the time of discharge.    DISPOSITION:  Patient will be discharged home in stable condition.    FOLLOW UP:  Michael Saucedo D.O.  [address] [place] [address] [address]  [facility] [address]  [phone]    In 1 week      OUTPATIENT MEDICATIONS:  Discharge Medication List as of 12/23/2024 10:26 PM        START taking these medications    Details   ondansetron (ZOFRAN ODT) 4 MG TABLET DISPERSIBLE Take 1 Tablet by mouth every 6 hours as needed for Nausea/Vomiting., Disp-10 Tablet, R-0, Normal           FINAL DIAGNOSIS  1. Vomiting and diarrhea      Senait HILL (Kathy), am scribing for, and in the presence of, Dinh Vaughan D.O..    Electronically signed by: Senait Davis (Kathy), 12/23/2024    Dinh HILL D.O. personally performed the services described in this documentation, as scribed by Senait Davis in my presence, and it is both accurate and complete.     The note accurately reflects work and decisions made by me.  Dinh Vaughan D.O.  12/23/2024  11:36 PM

## 2024-12-24 NOTE — ED TRIAGE NOTES
"Chief Complaint   Patient presents with    Nausea/Vomiting/Diarrhea     PT presents d/t nausea, vomiting, diarrhea, and chills x 2 days      /84   Pulse (!) 104   Resp 20   Ht 1.753 m (5' 9\")   Wt 78.7 kg (173 lb 8 oz)   SpO2 95%   BMI 25.62 kg/m²     "

## 2024-12-24 NOTE — DISCHARGE INSTRUCTIONS
Clear liquids only for the next 24 hours, use nausea medication as written.  Advance diet as tolerated after 24 hours return for change or worsening symptoms or vomiting medication does not work

## 2024-12-24 NOTE — ED NOTES
Pt. Verbalizes understanding of discharge instructions. accompanied to lobby with friend. Pt. Alert/awake in NAD.  All questions answered and understood. Advised to ff-up with PCP and medication teaching.

## 2025-05-26 DIAGNOSIS — R73.09 ELEVATED GLUCOSE LEVEL: Primary | ICD-10-CM

## 2025-05-26 DIAGNOSIS — R39.15 URINARY URGENCY: ICD-10-CM

## 2025-05-26 DIAGNOSIS — R35.0 URINARY FREQUENCY: ICD-10-CM

## 2025-05-27 ENCOUNTER — HOSPITAL ENCOUNTER (OUTPATIENT)
Dept: LAB | Facility: MEDICAL CENTER | Age: 25
End: 2025-05-27
Attending: FAMILY MEDICINE
Payer: COMMERCIAL

## 2025-05-27 DIAGNOSIS — R39.15 URINARY URGENCY: ICD-10-CM

## 2025-05-27 DIAGNOSIS — R35.0 URINARY FREQUENCY: ICD-10-CM

## 2025-05-27 DIAGNOSIS — R73.09 ELEVATED GLUCOSE LEVEL: ICD-10-CM

## 2025-05-27 LAB
ALBUMIN SERPL BCP-MCNC: 4.4 G/DL (ref 3.2–4.9)
ALBUMIN/GLOB SERPL: 1.4 G/DL
ALP SERPL-CCNC: 91 U/L (ref 30–99)
ALT SERPL-CCNC: 40 U/L (ref 2–50)
ANION GAP SERPL CALC-SCNC: 13 MMOL/L (ref 7–16)
APPEARANCE UR: CLEAR
AST SERPL-CCNC: 25 U/L (ref 12–45)
BASOPHILS # BLD AUTO: 1.1 % (ref 0–1.8)
BASOPHILS # BLD: 0.08 K/UL (ref 0–0.12)
BILIRUB SERPL-MCNC: 0.3 MG/DL (ref 0.1–1.5)
BILIRUB UR QL STRIP.AUTO: NEGATIVE
BUN SERPL-MCNC: 13 MG/DL (ref 8–22)
CALCIUM ALBUM COR SERPL-MCNC: 9.1 MG/DL (ref 8.5–10.5)
CALCIUM SERPL-MCNC: 9.4 MG/DL (ref 8.5–10.5)
CHLORIDE SERPL-SCNC: 105 MMOL/L (ref 96–112)
CO2 SERPL-SCNC: 21 MMOL/L (ref 20–33)
COLOR UR: YELLOW
CREAT SERPL-MCNC: 0.92 MG/DL (ref 0.5–1.4)
EOSINOPHIL # BLD AUTO: 0.4 K/UL (ref 0–0.51)
EOSINOPHIL NFR BLD: 5.5 % (ref 0–6.9)
ERYTHROCYTE [DISTWIDTH] IN BLOOD BY AUTOMATED COUNT: 40.5 FL (ref 35.9–50)
EST. AVERAGE GLUCOSE BLD GHB EST-MCNC: 108 MG/DL
GFR SERPLBLD CREATININE-BSD FMLA CKD-EPI: 118 ML/MIN/1.73 M 2
GLOBULIN SER CALC-MCNC: 3.2 G/DL (ref 1.9–3.5)
GLUCOSE SERPL-MCNC: 93 MG/DL (ref 65–99)
GLUCOSE UR STRIP.AUTO-MCNC: NEGATIVE MG/DL
HBA1C MFR BLD: 5.4 % (ref 4–5.6)
HCT VFR BLD AUTO: 47.3 % (ref 42–52)
HGB BLD-MCNC: 15.8 G/DL (ref 14–18)
IMM GRANULOCYTES # BLD AUTO: 0.04 K/UL (ref 0–0.11)
IMM GRANULOCYTES NFR BLD AUTO: 0.6 % (ref 0–0.9)
KETONES UR STRIP.AUTO-MCNC: NEGATIVE MG/DL
LEUKOCYTE ESTERASE UR QL STRIP.AUTO: NEGATIVE
LYMPHOCYTES # BLD AUTO: 3.9 K/UL (ref 1–4.8)
LYMPHOCYTES NFR BLD: 54 % (ref 22–41)
MCH RBC QN AUTO: 30.2 PG (ref 27–33)
MCHC RBC AUTO-ENTMCNC: 33.4 G/DL (ref 32.3–36.5)
MCV RBC AUTO: 90.4 FL (ref 81.4–97.8)
MICRO URNS: NORMAL
MONOCYTES # BLD AUTO: 0.54 K/UL (ref 0–0.85)
MONOCYTES NFR BLD AUTO: 7.5 % (ref 0–13.4)
NEUTROPHILS # BLD AUTO: 2.26 K/UL (ref 1.82–7.42)
NEUTROPHILS NFR BLD: 31.3 % (ref 44–72)
NITRITE UR QL STRIP.AUTO: NEGATIVE
NRBC # BLD AUTO: 0 K/UL
NRBC BLD-RTO: 0 /100 WBC (ref 0–0.2)
PH UR STRIP.AUTO: 5.5 [PH] (ref 5–8)
PLATELET # BLD AUTO: 304 K/UL (ref 164–446)
PMV BLD AUTO: 10.7 FL (ref 9–12.9)
POTASSIUM SERPL-SCNC: 4.3 MMOL/L (ref 3.6–5.5)
PROT SERPL-MCNC: 7.6 G/DL (ref 6–8.2)
PROT UR QL STRIP: NEGATIVE MG/DL
RBC # BLD AUTO: 5.23 M/UL (ref 4.7–6.1)
RBC UR QL AUTO: NEGATIVE
SODIUM SERPL-SCNC: 139 MMOL/L (ref 135–145)
SP GR UR STRIP.AUTO: 1.02
UROBILINOGEN UR STRIP.AUTO-MCNC: 0.2 EU/DL
WBC # BLD AUTO: 7.2 K/UL (ref 4.8–10.8)

## 2025-05-27 PROCEDURE — 81003 URINALYSIS AUTO W/O SCOPE: CPT

## 2025-05-27 PROCEDURE — 80053 COMPREHEN METABOLIC PANEL: CPT

## 2025-05-27 PROCEDURE — 87086 URINE CULTURE/COLONY COUNT: CPT

## 2025-05-27 PROCEDURE — 85025 COMPLETE CBC W/AUTO DIFF WBC: CPT

## 2025-05-27 PROCEDURE — 36415 COLL VENOUS BLD VENIPUNCTURE: CPT

## 2025-05-27 PROCEDURE — 83036 HEMOGLOBIN GLYCOSYLATED A1C: CPT

## 2025-05-27 NOTE — PROGRESS NOTES
Patient called the on call service 5/26/25.  Spoke with him.  Having frequent urination and urgency.  Denies dysuria.  Does not leak urine and no nocturnal enuresis but some nocturia.  Concerned he may have diabetes.  Denies visible blood.  Some chills but no fever.  Lab orders discussed and placed.  Previous lab results benign overall with occasional trace intact blood and protein in the urine.  Also crystals and borderline increased specific gravity.  Mild glucose elevation in past.  MRI of abdomen 8/2023 at [facility] was benign.  Patient will complete the lab testing tomorrow.  Did not advise ER at this time.

## 2025-05-28 ENCOUNTER — RESULTS FOLLOW-UP (OUTPATIENT)
Dept: MEDICAL GROUP | Facility: MEDICAL CENTER | Age: 25
End: 2025-05-28

## 2025-05-28 DIAGNOSIS — R39.15 URINARY URGENCY: Primary | ICD-10-CM

## 2025-05-30 LAB
BACTERIA UR CULT: NORMAL
SIGNIFICANT IND 70042: NORMAL
SITE SITE: NORMAL
SOURCE SOURCE: NORMAL

## 2025-06-05 NOTE — Clinical Note
REFERRAL APPROVAL NOTICE         Sent on June 5, 2025                   Mynor Lewis  [address]  [facility] [address]                   Dear Mr. Jose Armando Lewis,    After a careful review of the medical information and benefit coverage, [facility] has processed your referral. See below for additional details.    If applicable, you must be actively enrolled with your insurance for coverage of the authorized service. If you have any questions regarding your coverage, please contact your insurance directly.    REFERRAL INFORMATION   Referral #:  [id number]  Referred-To Department    Referred-By Provider:  Urology    Negro Garcia M.D.   [facility]      [address]  [place] [address]  [phone] [address]  [place] [id number] [phone]    Referral Start Date:  05/28/2025  Referral End Date:   05/28/2026             SCHEDULING  If you do not already have an appointment, please call [phone] to make an appointment.     MORE INFORMATION  If you do not already have a [employer] account, sign up at: [email or web address]  You can access your medical information, make appointments, see lab results, billing information, and more.  If you have questions regarding this referral, please contact  the [facility] Referrals department at:             [phone]. Monday - Friday 8:00AM - 5:00PM.     Sincerely,    [facility]

## 2025-07-08 ENCOUNTER — OFFICE VISIT (OUTPATIENT)
Dept: UROLOGY | Facility: MEDICAL CENTER | Age: 25
End: 2025-07-08
Payer: COMMERCIAL

## 2025-07-08 DIAGNOSIS — R39.15 URINARY URGENCY: Primary | ICD-10-CM

## 2025-07-08 LAB
POC POST-VOID: 39 ML
POC PRE-VOID: NORMAL

## 2025-07-08 PROCEDURE — 99203 OFFICE O/P NEW LOW 30 MIN: CPT

## 2025-07-08 PROCEDURE — 51798 US URINE CAPACITY MEASURE: CPT

## 2025-07-08 NOTE — PROGRESS NOTES
Subjective  Mynor Lewis is a 25 y.o. male who presents today for evaluation of urinary frequency/urgency.He has been dealing with this for over 2 years.  He had a renal ultrasound in 2023 which showed possible bladder wall thickening    He reports sensation of incomplete empyting.    Urine culture was negative    Patient denies gross hematuria, dysuria, nocturia, concerns for STI, and recurrent urinary tract infections.    He has been vaping for 10 years, and unknown history of bladder cancer  He has a history of kidney stones when he was 8 years old.   He reports he drinks soda, and energy drinks throughout the day    He has not tried any medication for this or pelvic floor physical therapy.    Family History   Problem Relation Age of Onset    No Known Problems Mother     Hypertension Father        Social History     Socioeconomic History    Marital status: Single     Spouse name: Not on file    Number of children: Not on file    Years of education: Not on file    Highest education level: Not on file   Occupational History    Not on file   Tobacco Use    Smoking status: Never    Smokeless tobacco: Never   Vaping Use    Vaping status: Every Day    Substances: Nicotine, Flavoring   Substance and Sexual Activity    Alcohol use: Not Currently     Comment: occassions    Drug use: Not Currently     Types: Marijuana    Sexual activity: Yes     Partners: Female     Birth control/protection: Condom   Other Topics Concern    Not on file   Social History Narrative    Not on file     Social Drivers of Health     Financial Resource Strain: Not on file   Food Insecurity: Not on file   Transportation Needs: Not on file   Physical Activity: Not on file   Stress: Not on file   Social Connections: Not on file   Intimate Partner Violence: Not on file   Housing Stability: Not on file       Past Surgical History[1]    Past Medical History[2]    Current Medications[3]    Allergies[4]    Objective  There were  no vitals taken for this visit.  Physical Exam  Constitutional:       Appearance: Normal appearance.   HENT:      Head: Normocephalic and atraumatic.   Eyes:      Extraocular Movements: Extraocular movements intact.   Pulmonary:      Effort: Pulmonary effort is normal.   Genitourinary:     Prostate: Normal.      Comments: PVR 39  Skin:     General: Skin is warm.   Neurological:      Mental Status: He is alert.   Psychiatric:         Mood and Affect: Mood normal.         Behavior: Behavior normal.       Labs:   None    Imaging:   None    Assessment    For urinary frequency/urgency: I have provided him the interstitial cystitis food trigger list.  I have recommended that he decrease caffeine/bladder irritants, along with decreasing nicotine intake as this can cause urinary urgency and frequency.  I also encouraged him to use Prelief before eating or consuming bladder irritants.  Considered Azo x 7 days.  Considered gabapentin nightly.  Because this has been an ongoing issue x 2 years, I have offered more aggressive therapies for him however with regard to his age considering lifestyle modification first as patient prefers    PVR discussed with patient which was 39 mL     We are trialing lifestyle modification before considering cystoscopy/CT urogram versus CT renal colic    Considered pelvic floor physical therapy however, patient declined at this time.  Patient uses his YouTube for pelvic floor physical therapy exercises      Plan  Lifestyle modification  Prelief  IC trigger list provided  Follow-up in 4 weeks to see if improvement in urinary frequency/urgency with decreased use of soda/energy drinks.  Consider imaging and cystoscopy    Problem List Items Addressed This Visit       Urinary urgency - Primary    Relevant Orders    POCT Bladder Scan (Completed)            [1] History reviewed. No pertinent surgical history.  [2]   Past Medical History:  Diagnosis Date    Encounter to establish care 2/22/2023   [3]    Current Outpatient Medications   Medication Sig Dispense Refill    ondansetron (ZOFRAN ODT) 4 MG TABLET DISPERSIBLE Take 1 Tablet by mouth every 6 hours as needed for Nausea/Vomiting. 10 Tablet 0     No current facility-administered medications for this visit.   [4] No Known Allergies

## 2025-08-05 ENCOUNTER — OFFICE VISIT (OUTPATIENT)
Dept: UROLOGY | Facility: MEDICAL CENTER | Age: 25
End: 2025-08-05
Payer: COMMERCIAL

## 2025-08-05 DIAGNOSIS — R39.15 URINARY URGENCY: Primary | ICD-10-CM

## 2025-08-05 LAB
POC POST-VOID: 26 ML
POC PRE-VOID: NORMAL

## 2025-08-05 PROCEDURE — 99213 OFFICE O/P EST LOW 20 MIN: CPT

## 2025-08-05 PROCEDURE — 51798 US URINE CAPACITY MEASURE: CPT
